# Patient Record
Sex: FEMALE | Race: WHITE
[De-identification: names, ages, dates, MRNs, and addresses within clinical notes are randomized per-mention and may not be internally consistent; named-entity substitution may affect disease eponyms.]

---

## 2017-01-11 ENCOUNTER — RX ONLY (OUTPATIENT)
Age: 77
Setting detail: RX ONLY
End: 2017-01-11

## 2017-01-11 PROBLEM — L57.9 SKIN CHANGES DUE TO CHRONIC EXPOSURE TO NONIONIZING RADIATION, UNSPECIFIED: Status: ACTIVE | Noted: 2017-01-11

## 2017-01-25 PROBLEM — D49.2 NEOPLASM OF UNSPECIFIED BEHAVIOR OF BONE, SOFT TISSUE, AND SKIN: Status: RESOLVED | Noted: 2017-01-11 | Resolved: 2017-01-25

## 2017-02-27 RX ORDER — ALENDRONATE SODIUM 70 MG/1
TABLET ORAL
Refills: 0 | OUTPATIENT
Start: 2017-02-27

## 2021-01-14 DIAGNOSIS — Z23 NEED FOR VACCINATION: ICD-10-CM

## 2023-10-20 ENCOUNTER — APPOINTMENT (OUTPATIENT)
Dept: RADIOLOGY | Facility: MEDICAL CENTER | Age: 83
End: 2023-10-20
Attending: STUDENT IN AN ORGANIZED HEALTH CARE EDUCATION/TRAINING PROGRAM
Payer: MEDICARE

## 2023-10-20 ENCOUNTER — HOSPITAL ENCOUNTER (OUTPATIENT)
Facility: MEDICAL CENTER | Age: 83
End: 2023-10-21
Attending: STUDENT IN AN ORGANIZED HEALTH CARE EDUCATION/TRAINING PROGRAM | Admitting: INTERNAL MEDICINE
Payer: MEDICARE

## 2023-10-20 DIAGNOSIS — I63.9 CEREBROVASCULAR ACCIDENT (CVA), UNSPECIFIED MECHANISM (HCC): ICD-10-CM

## 2023-10-20 DIAGNOSIS — R55 SYNCOPE AND COLLAPSE: ICD-10-CM

## 2023-10-20 LAB
ALBUMIN SERPL BCP-MCNC: 3.4 G/DL (ref 3.2–4.9)
ALBUMIN/GLOB SERPL: 1.1 G/DL
ALP SERPL-CCNC: 65 U/L (ref 30–99)
ALT SERPL-CCNC: 13 U/L (ref 2–50)
ANION GAP SERPL CALC-SCNC: 12 MMOL/L (ref 7–16)
AST SERPL-CCNC: 22 U/L (ref 12–45)
BASOPHILS # BLD AUTO: 0.3 % (ref 0–1.8)
BASOPHILS # BLD: 0.03 K/UL (ref 0–0.12)
BILIRUB SERPL-MCNC: 0.2 MG/DL (ref 0.1–1.5)
BUN SERPL-MCNC: 25 MG/DL (ref 8–22)
CALCIUM ALBUM COR SERPL-MCNC: 8.2 MG/DL (ref 8.5–10.5)
CALCIUM SERPL-MCNC: 7.7 MG/DL (ref 8.5–10.5)
CHLORIDE SERPL-SCNC: 108 MMOL/L (ref 96–112)
CO2 SERPL-SCNC: 21 MMOL/L (ref 20–33)
COMMENT 1642: NORMAL
CREAT SERPL-MCNC: 1.51 MG/DL (ref 0.5–1.4)
EOSINOPHIL # BLD AUTO: 0.07 K/UL (ref 0–0.51)
EOSINOPHIL NFR BLD: 0.6 % (ref 0–6.9)
ERYTHROCYTE [DISTWIDTH] IN BLOOD BY AUTOMATED COUNT: 65.8 FL (ref 35.9–50)
GFR SERPLBLD CREATININE-BSD FMLA CKD-EPI: 34 ML/MIN/1.73 M 2
GLOBULIN SER CALC-MCNC: 3.2 G/DL (ref 1.9–3.5)
GLUCOSE SERPL-MCNC: 144 MG/DL (ref 65–99)
HCT VFR BLD AUTO: 34.6 % (ref 37–47)
HGB BLD-MCNC: 10.7 G/DL (ref 12–16)
IMM GRANULOCYTES # BLD AUTO: 0.04 K/UL (ref 0–0.11)
IMM GRANULOCYTES NFR BLD AUTO: 0.4 % (ref 0–0.9)
LYMPHOCYTES # BLD AUTO: 2.32 K/UL (ref 1–4.8)
LYMPHOCYTES NFR BLD: 21.1 % (ref 22–41)
MCH RBC QN AUTO: 28.7 PG (ref 27–33)
MCHC RBC AUTO-ENTMCNC: 30.9 G/DL (ref 32.2–35.5)
MCV RBC AUTO: 92.8 FL (ref 81.4–97.8)
MONOCYTES # BLD AUTO: 1.17 K/UL (ref 0–0.85)
MONOCYTES NFR BLD AUTO: 10.6 % (ref 0–13.4)
MORPHOLOGY BLD-IMP: NORMAL
NEUTROPHILS # BLD AUTO: 7.37 K/UL (ref 1.82–7.42)
NEUTROPHILS NFR BLD: 67 % (ref 44–72)
NRBC # BLD AUTO: 0 K/UL
NRBC BLD-RTO: 0 /100 WBC (ref 0–0.2)
PLATELET # BLD AUTO: 292 K/UL (ref 164–446)
PLATELET BLD QL SMEAR: NORMAL
PMV BLD AUTO: 11.5 FL (ref 9–12.9)
POIKILOCYTOSIS BLD QL SMEAR: NORMAL
POTASSIUM SERPL-SCNC: 4.1 MMOL/L (ref 3.6–5.5)
PROT SERPL-MCNC: 6.6 G/DL (ref 6–8.2)
RBC # BLD AUTO: 3.73 M/UL (ref 4.2–5.4)
RBC BLD AUTO: PRESENT
SODIUM SERPL-SCNC: 141 MMOL/L (ref 135–145)
TROPONIN T SERPL-MCNC: 19 NG/L (ref 6–19)
WBC # BLD AUTO: 11 K/UL (ref 4.8–10.8)

## 2023-10-20 PROCEDURE — 84439 ASSAY OF FREE THYROXINE: CPT

## 2023-10-20 PROCEDURE — 71045 X-RAY EXAM CHEST 1 VIEW: CPT

## 2023-10-20 PROCEDURE — 36415 COLL VENOUS BLD VENIPUNCTURE: CPT

## 2023-10-20 PROCEDURE — 85025 COMPLETE CBC W/AUTO DIFF WBC: CPT

## 2023-10-20 PROCEDURE — 99285 EMERGENCY DEPT VISIT HI MDM: CPT

## 2023-10-20 PROCEDURE — 80053 COMPREHEN METABOLIC PANEL: CPT

## 2023-10-20 PROCEDURE — 84443 ASSAY THYROID STIM HORMONE: CPT

## 2023-10-20 PROCEDURE — 700105 HCHG RX REV CODE 258: Performed by: STUDENT IN AN ORGANIZED HEALTH CARE EDUCATION/TRAINING PROGRAM

## 2023-10-20 PROCEDURE — 84484 ASSAY OF TROPONIN QUANT: CPT

## 2023-10-20 PROCEDURE — 82533 TOTAL CORTISOL: CPT

## 2023-10-20 PROCEDURE — 82550 ASSAY OF CK (CPK): CPT

## 2023-10-20 PROCEDURE — 70450 CT HEAD/BRAIN W/O DYE: CPT

## 2023-10-20 RX ORDER — SODIUM CHLORIDE 9 MG/ML
1000 INJECTION, SOLUTION INTRAVENOUS ONCE
Status: COMPLETED | OUTPATIENT
Start: 2023-10-20 | End: 2023-10-21

## 2023-10-20 RX ADMIN — SODIUM CHLORIDE 1000 ML: 9 INJECTION, SOLUTION INTRAVENOUS at 22:32

## 2023-10-21 ENCOUNTER — HOSPITAL ENCOUNTER (OUTPATIENT)
Dept: RADIOLOGY | Facility: MEDICAL CENTER | Age: 83
End: 2023-10-21

## 2023-10-21 VITALS
DIASTOLIC BLOOD PRESSURE: 66 MMHG | RESPIRATION RATE: 18 BRPM | TEMPERATURE: 98.6 F | SYSTOLIC BLOOD PRESSURE: 157 MMHG | HEART RATE: 68 BPM | OXYGEN SATURATION: 95 % | BODY MASS INDEX: 28.28 KG/M2 | HEIGHT: 65 IN | WEIGHT: 169.75 LBS

## 2023-10-21 PROBLEM — I63.9 CVA (CEREBRAL VASCULAR ACCIDENT) (HCC): Status: ACTIVE | Noted: 2023-10-21

## 2023-10-21 PROBLEM — R55 SYNCOPE AND COLLAPSE: Status: RESOLVED | Noted: 2023-10-21 | Resolved: 2023-10-21

## 2023-10-21 PROBLEM — R55 SYNCOPE AND COLLAPSE: Status: ACTIVE | Noted: 2023-10-21

## 2023-10-21 LAB
APPEARANCE UR: CLEAR
BACTERIA #/AREA URNS HPF: NEGATIVE /HPF
BILIRUB UR QL STRIP.AUTO: NEGATIVE
CK SERPL-CCNC: 330 U/L (ref 0–154)
COLOR UR: YELLOW
CORTIS SERPL-MCNC: 25.1 UG/DL (ref 0–23)
EKG IMPRESSION: NORMAL
EPI CELLS #/AREA URNS HPF: ABNORMAL /HPF
GLUCOSE BLD STRIP.AUTO-MCNC: 113 MG/DL (ref 65–99)
GLUCOSE BLD STRIP.AUTO-MCNC: 114 MG/DL (ref 65–99)
GLUCOSE UR STRIP.AUTO-MCNC: NEGATIVE MG/DL
HYALINE CASTS #/AREA URNS LPF: ABNORMAL /LPF
KETONES UR STRIP.AUTO-MCNC: ABNORMAL MG/DL
LEUKOCYTE ESTERASE UR QL STRIP.AUTO: NEGATIVE
MICRO URNS: ABNORMAL
NITRITE UR QL STRIP.AUTO: NEGATIVE
PH UR STRIP.AUTO: 5 [PH] (ref 5–8)
PROT UR QL STRIP: 30 MG/DL
RBC # URNS HPF: ABNORMAL /HPF
RBC UR QL AUTO: NEGATIVE
SP GR UR STRIP.AUTO: 1.02
T4 FREE SERPL-MCNC: 1.11 NG/DL (ref 0.93–1.7)
TSH SERPL DL<=0.005 MIU/L-ACNC: 5.75 UIU/ML (ref 0.38–5.33)
UROBILINOGEN UR STRIP.AUTO-MCNC: 1 MG/DL
WBC #/AREA URNS HPF: ABNORMAL /HPF

## 2023-10-21 PROCEDURE — 97161 PT EVAL LOW COMPLEX 20 MIN: CPT

## 2023-10-21 PROCEDURE — 92610 EVALUATE SWALLOWING FUNCTION: CPT

## 2023-10-21 PROCEDURE — 96372 THER/PROPH/DIAG INJ SC/IM: CPT

## 2023-10-21 PROCEDURE — 93010 ELECTROCARDIOGRAM REPORT: CPT | Mod: MISDOCU | Performed by: INTERNAL MEDICINE

## 2023-10-21 PROCEDURE — 93005 ELECTROCARDIOGRAM TRACING: CPT | Performed by: INTERNAL MEDICINE

## 2023-10-21 PROCEDURE — 81001 URINALYSIS AUTO W/SCOPE: CPT

## 2023-10-21 PROCEDURE — A9270 NON-COVERED ITEM OR SERVICE: HCPCS | Performed by: INTERNAL MEDICINE

## 2023-10-21 PROCEDURE — G0378 HOSPITAL OBSERVATION PER HR: HCPCS

## 2023-10-21 PROCEDURE — 700105 HCHG RX REV CODE 258: Performed by: INTERNAL MEDICINE

## 2023-10-21 PROCEDURE — 82962 GLUCOSE BLOOD TEST: CPT | Mod: 91

## 2023-10-21 PROCEDURE — 700102 HCHG RX REV CODE 250 W/ 637 OVERRIDE(OP): Performed by: INTERNAL MEDICINE

## 2023-10-21 PROCEDURE — 700111 HCHG RX REV CODE 636 W/ 250 OVERRIDE (IP): Performed by: INTERNAL MEDICINE

## 2023-10-21 PROCEDURE — 700105 HCHG RX REV CODE 258

## 2023-10-21 RX ORDER — HYDROCODONE BITARTRATE AND ACETAMINOPHEN 7.5; 325 MG/1; MG/1
1 TABLET ORAL 3 TIMES DAILY PRN
COMMUNITY
Start: 2023-10-02

## 2023-10-21 RX ORDER — SIMVASTATIN 20 MG
40 TABLET ORAL EVERY EVENING
Status: DISCONTINUED | OUTPATIENT
Start: 2023-10-21 | End: 2023-10-21 | Stop reason: HOSPADM

## 2023-10-21 RX ORDER — HYDRALAZINE HYDROCHLORIDE 20 MG/ML
10 INJECTION INTRAMUSCULAR; INTRAVENOUS EVERY 4 HOURS PRN
Status: DISCONTINUED | OUTPATIENT
Start: 2023-10-21 | End: 2023-10-21 | Stop reason: HOSPADM

## 2023-10-21 RX ORDER — METHYLPREDNISOLONE 4 MG/1
4 TABLET ORAL DAILY
COMMUNITY

## 2023-10-21 RX ORDER — DULOXETIN HYDROCHLORIDE 60 MG/1
60 CAPSULE, DELAYED RELEASE ORAL DAILY
COMMUNITY
Start: 2023-07-31

## 2023-10-21 RX ORDER — SODIUM CHLORIDE 9 MG/ML
INJECTION, SOLUTION INTRAVENOUS CONTINUOUS
Status: DISCONTINUED | OUTPATIENT
Start: 2023-10-21 | End: 2023-10-21 | Stop reason: HOSPADM

## 2023-10-21 RX ORDER — ONDANSETRON 4 MG/1
4 TABLET, ORALLY DISINTEGRATING ORAL EVERY 4 HOURS PRN
Status: DISCONTINUED | OUTPATIENT
Start: 2023-10-21 | End: 2023-10-21

## 2023-10-21 RX ORDER — DENOSUMAB 60 MG/ML
60 INJECTION SUBCUTANEOUS
COMMUNITY
Start: 2023-07-27

## 2023-10-21 RX ORDER — GABAPENTIN 400 MG/1
400 CAPSULE ORAL 3 TIMES DAILY
COMMUNITY
Start: 2023-10-09

## 2023-10-21 RX ORDER — ACETAMINOPHEN 325 MG/1
650 TABLET ORAL EVERY 6 HOURS PRN
Status: DISCONTINUED | OUTPATIENT
Start: 2023-10-21 | End: 2023-10-21 | Stop reason: HOSPADM

## 2023-10-21 RX ORDER — ONDANSETRON 2 MG/ML
4 INJECTION INTRAMUSCULAR; INTRAVENOUS EVERY 4 HOURS PRN
Status: DISCONTINUED | OUTPATIENT
Start: 2023-10-21 | End: 2023-10-21

## 2023-10-21 RX ORDER — ASPIRIN 325 MG
325 TABLET, DELAYED RELEASE (ENTERIC COATED) ORAL DAILY
Qty: 30 TABLET | COMMUNITY
Start: 2023-10-21

## 2023-10-21 RX ORDER — FLUOXETINE HYDROCHLORIDE 20 MG/1
20 CAPSULE ORAL DAILY
Status: DISCONTINUED | OUTPATIENT
Start: 2023-10-21 | End: 2023-10-21

## 2023-10-21 RX ORDER — NETARSUDIL 0.2 MG/ML
1 SOLUTION/ DROPS OPHTHALMIC; TOPICAL NIGHTLY
COMMUNITY
Start: 2023-09-18

## 2023-10-21 RX ORDER — BISACODYL 10 MG
10 SUPPOSITORY, RECTAL RECTAL
Status: DISCONTINUED | OUTPATIENT
Start: 2023-10-21 | End: 2023-10-21 | Stop reason: HOSPADM

## 2023-10-21 RX ORDER — LATANOPROST 50 UG/ML
1 SOLUTION/ DROPS OPHTHALMIC NIGHTLY
Status: DISCONTINUED | OUTPATIENT
Start: 2023-10-21 | End: 2023-10-21 | Stop reason: HOSPADM

## 2023-10-21 RX ORDER — AMOXICILLIN 250 MG
2 CAPSULE ORAL 2 TIMES DAILY
Status: DISCONTINUED | OUTPATIENT
Start: 2023-10-21 | End: 2023-10-21 | Stop reason: HOSPADM

## 2023-10-21 RX ORDER — ALBUTEROL SULFATE 90 UG/1
2 AEROSOL, METERED RESPIRATORY (INHALATION) EVERY 6 HOURS PRN
Status: DISCONTINUED | OUTPATIENT
Start: 2023-10-21 | End: 2023-10-21

## 2023-10-21 RX ORDER — LEVOTHYROXINE SODIUM 0.03 MG/1
25 TABLET ORAL
COMMUNITY
Start: 2023-08-03

## 2023-10-21 RX ORDER — OXYCODONE HYDROCHLORIDE 5 MG/1
5 TABLET ORAL
Status: DISCONTINUED | OUTPATIENT
Start: 2023-10-21 | End: 2023-10-21 | Stop reason: HOSPADM

## 2023-10-21 RX ORDER — HYDROMORPHONE HYDROCHLORIDE 1 MG/ML
0.25 INJECTION, SOLUTION INTRAMUSCULAR; INTRAVENOUS; SUBCUTANEOUS
Status: DISCONTINUED | OUTPATIENT
Start: 2023-10-21 | End: 2023-10-21 | Stop reason: HOSPADM

## 2023-10-21 RX ORDER — ASPIRIN 81 MG/1
81 TABLET ORAL DAILY
Status: DISCONTINUED | OUTPATIENT
Start: 2023-10-21 | End: 2023-10-21 | Stop reason: HOSPADM

## 2023-10-21 RX ORDER — POLYETHYLENE GLYCOL 3350 17 G/17G
1 POWDER, FOR SOLUTION ORAL
Status: DISCONTINUED | OUTPATIENT
Start: 2023-10-21 | End: 2023-10-21 | Stop reason: HOSPADM

## 2023-10-21 RX ORDER — PREDNISONE 10 MG/1
5 TABLET ORAL DAILY
Status: DISCONTINUED | OUTPATIENT
Start: 2023-10-21 | End: 2023-10-21

## 2023-10-21 RX ORDER — M-VIT,TX,IRON,MINS/CALC/FOLIC 27MG-0.4MG
1 TABLET ORAL DAILY
COMMUNITY

## 2023-10-21 RX ORDER — CHLORAL HYDRATE 500 MG
1000 CAPSULE ORAL DAILY
COMMUNITY

## 2023-10-21 RX ORDER — DULOXETIN HYDROCHLORIDE 60 MG/1
60 CAPSULE, DELAYED RELEASE ORAL DAILY
Status: DISCONTINUED | OUTPATIENT
Start: 2023-10-21 | End: 2023-10-21 | Stop reason: HOSPADM

## 2023-10-21 RX ORDER — DORZOLAMIDE HCL 20 MG/ML
1 SOLUTION/ DROPS OPHTHALMIC 2 TIMES DAILY
Status: DISCONTINUED | OUTPATIENT
Start: 2023-10-21 | End: 2023-10-21

## 2023-10-21 RX ORDER — LATANOPROST 50 UG/ML
1 SOLUTION/ DROPS OPHTHALMIC NIGHTLY
COMMUNITY
Start: 2023-10-01

## 2023-10-21 RX ORDER — DORZOLAMIDE HYDROCHLORIDE AND TIMOLOL MALEATE 20; 5 MG/ML; MG/ML
1 SOLUTION/ DROPS OPHTHALMIC 2 TIMES DAILY
COMMUNITY
Start: 2023-09-05

## 2023-10-21 RX ORDER — HEPARIN SODIUM 5000 [USP'U]/ML
5000 INJECTION, SOLUTION INTRAVENOUS; SUBCUTANEOUS EVERY 8 HOURS
Status: DISCONTINUED | OUTPATIENT
Start: 2023-10-21 | End: 2023-10-21 | Stop reason: HOSPADM

## 2023-10-21 RX ORDER — DORZOLAMIDE HYDROCHLORIDE AND TIMOLOL MALEATE 20; 5 MG/ML; MG/ML
1 SOLUTION/ DROPS OPHTHALMIC 2 TIMES DAILY
Status: DISCONTINUED | OUTPATIENT
Start: 2023-10-21 | End: 2023-10-21 | Stop reason: HOSPADM

## 2023-10-21 RX ORDER — OXYCODONE HYDROCHLORIDE 5 MG/1
2.5 TABLET ORAL
Status: DISCONTINUED | OUTPATIENT
Start: 2023-10-21 | End: 2023-10-21 | Stop reason: HOSPADM

## 2023-10-21 RX ORDER — LEVOTHYROXINE SODIUM 0.03 MG/1
25 TABLET ORAL
Status: DISCONTINUED | OUTPATIENT
Start: 2023-10-21 | End: 2023-10-21 | Stop reason: HOSPADM

## 2023-10-21 RX ORDER — VITAMIN B COMPLEX
1000 TABLET ORAL DAILY
COMMUNITY

## 2023-10-21 RX ADMIN — SODIUM CHLORIDE: 9 INJECTION, SOLUTION INTRAVENOUS at 11:03

## 2023-10-21 RX ADMIN — OXYCODONE HYDROCHLORIDE 5 MG: 5 TABLET ORAL at 10:11

## 2023-10-21 RX ADMIN — DORZOLAMIDE HYDROCHLORIDE AND TIMOLOL MALEATE 1 DROP: 20; 5 SOLUTION/ DROPS OPHTHALMIC at 05:55

## 2023-10-21 RX ADMIN — LEVOTHYROXINE SODIUM 25 MCG: 0.03 TABLET ORAL at 05:55

## 2023-10-21 RX ADMIN — ASPIRIN 81 MG: 81 TABLET, COATED ORAL at 05:55

## 2023-10-21 RX ADMIN — LATANOPROST 1 DROP: 50 SOLUTION OPHTHALMIC at 05:55

## 2023-10-21 RX ADMIN — OXYCODONE HYDROCHLORIDE 2.5 MG: 5 TABLET ORAL at 15:28

## 2023-10-21 RX ADMIN — HEPARIN SODIUM 5000 UNITS: 5000 INJECTION, SOLUTION INTRAVENOUS; SUBCUTANEOUS at 05:55

## 2023-10-21 RX ADMIN — SODIUM CHLORIDE: 9 INJECTION, SOLUTION INTRAVENOUS at 02:23

## 2023-10-21 RX ADMIN — DULOXETINE HYDROCHLORIDE 60 MG: 60 CAPSULE, DELAYED RELEASE ORAL at 05:55

## 2023-10-21 ASSESSMENT — GAIT ASSESSMENTS
ASSISTIVE DEVICE: FRONT WHEEL WALKER
GAIT LEVEL OF ASSIST: STANDBY ASSIST
DISTANCE (FEET): 250

## 2023-10-21 ASSESSMENT — ENCOUNTER SYMPTOMS
FOCAL WEAKNESS: 0
DOUBLE VISION: 0
ORTHOPNEA: 0
BLURRED VISION: 0
LOSS OF CONSCIOUSNESS: 1
HEMOPTYSIS: 0
PHOTOPHOBIA: 0
PALPITATIONS: 0
BRUISES/BLEEDS EASILY: 0
TREMORS: 0
HEARTBURN: 0
SPUTUM PRODUCTION: 0
WEIGHT LOSS: 0
FLANK PAIN: 0
NECK PAIN: 0
HEADACHES: 0
BACK PAIN: 0
POLYDIPSIA: 0
COUGH: 0
SPEECH CHANGE: 0
NAUSEA: 0
CHILLS: 0
FEVER: 0
NERVOUS/ANXIOUS: 0
VOMITING: 0
HALLUCINATIONS: 0

## 2023-10-21 ASSESSMENT — LIFESTYLE VARIABLES
TOTAL SCORE: 0
EVER HAD A DRINK FIRST THING IN THE MORNING TO STEADY YOUR NERVES TO GET RID OF A HANGOVER: NO
SUBSTANCE_ABUSE: 0
TOTAL SCORE: 0
CONSUMPTION TOTAL: NEGATIVE
ALCOHOL_USE: NO
HOW MANY TIMES IN THE PAST YEAR HAVE YOU HAD 5 OR MORE DRINKS IN A DAY: 0
TOTAL SCORE: 0
ON A TYPICAL DAY WHEN YOU DRINK ALCOHOL HOW MANY DRINKS DO YOU HAVE: 0
HAVE PEOPLE ANNOYED YOU BY CRITICIZING YOUR DRINKING: NO
HAVE YOU EVER FELT YOU SHOULD CUT DOWN ON YOUR DRINKING: NO
AVERAGE NUMBER OF DAYS PER WEEK YOU HAVE A DRINK CONTAINING ALCOHOL: 0
EVER FELT BAD OR GUILTY ABOUT YOUR DRINKING: NO
DOES PATIENT WANT TO STOP DRINKING: NO

## 2023-10-21 ASSESSMENT — COGNITIVE AND FUNCTIONAL STATUS - GENERAL
TURNING FROM BACK TO SIDE WHILE IN FLAT BAD: A LITTLE
MOBILITY SCORE: 18
WALKING IN HOSPITAL ROOM: A LITTLE
SUGGESTED CMS G CODE MODIFIER MOBILITY: CK
CLIMB 3 TO 5 STEPS WITH RAILING: A LITTLE
MOVING FROM LYING ON BACK TO SITTING ON SIDE OF FLAT BED: A LITTLE
STANDING UP FROM CHAIR USING ARMS: A LITTLE
MOVING TO AND FROM BED TO CHAIR: A LITTLE

## 2023-10-21 ASSESSMENT — PATIENT HEALTH QUESTIONNAIRE - PHQ9
2. FEELING DOWN, DEPRESSED, IRRITABLE, OR HOPELESS: NOT AT ALL
1. LITTLE INTEREST OR PLEASURE IN DOING THINGS: NOT AT ALL
SUM OF ALL RESPONSES TO PHQ9 QUESTIONS 1 AND 2: 0

## 2023-10-21 ASSESSMENT — PAIN DESCRIPTION - PAIN TYPE
TYPE: CHRONIC PAIN

## 2023-10-21 ASSESSMENT — FIBROSIS 4 INDEX: FIB4 SCORE: 1.73

## 2023-10-21 NOTE — PROGRESS NOTES
2 RN Skin Assessment Completed by Jessie RN and Debi RN.     Head: WDL  Ears: WDL  Nose: WDL  Mouth: WDL  Neck: WDL  Breasts/Chest: Left implanted cardiac monitor recently placed, with gauze and tegaderm dressing over it.   Shoulder Blades: WDL  Spine: WDL  (R) Arm/Elbow/hand: WDL  (L) Arm/Elbow/hand: WDL  Abdomen:WDL  Groin: WDL  Sacrum/Coccyx/Buttocks: blanching  (R) Leg: Generalized lower leg edema  (L) Leg: Generalized lower leg edema  (R) Heel/Foot/Toe: blanching  (L) Heel/Foot/Toe: blanching              Devices in place: BP Cuff and Pulse Ox     Interventions in place: Pillows     Possible skin injury found: No     Pictures uploaded into Epic: N/A  Wound Consult Placed: N/A

## 2023-10-21 NOTE — DISCHARGE PLANNING
Case Management Discharge Planning    Admission Date: 10/20/2023  GMLOS:    ALOS: 0    6-Clicks ADL Score:    6-Clicks Mobility Score: 18      Anticipated Discharge Dispo:      DME Needed: No    Action(s) Taken: Choice obtained and Referral(s) sent Signed choice form sent to [facility] for [facility].    Escalations Completed: None    Medically Clear: Yes    Next Steps: Per P/T: Patient needing HH for SN, P/T and O/T for speech, recent stroke.    Barriers to Discharge: None    Is the patient up for discharge tomorrow: No Today, patient's SO is bedside and can provide ride home.
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
## 2023-10-21 NOTE — ED TRIAGE NOTES
[facility]  83 y.o. female  Chief Complaint   Patient presents with    T-5000 FALL    ALOC     Pt BIB EMS for above complaint.  reports patient walking with wheeled walker at home, legs gace out and fell to ground. (+) headstrike. (-) LOC or thinner. BGL - 201mg/dL. Aox3. Per EMS, initial GCS 3, BP in the 30s, with shallow breathing. BVM done. No other treatment given. Patient spontaneously  became GCS -14 during transport.      Discharged from [facility] a few hours prior for stroke. Left chest cardiac monitor implant.     speaking in full sentences, follows commands and responds appropriately to questions. Resp are even and unlabored. Patient denies pain.       Vitals:    10/20/23 2214   BP: (!) 128/101   Pulse: 69   Resp: 17   Temp: 36 °C (96.8 °F)   SpO2: 93%

## 2023-10-21 NOTE — DISCHARGE SUMMARY
Discharge Summary    CHIEF COMPLAINT ON ADMISSION  Chief Complaint   Patient presents with    T-5000 FALL    ALOC       Reason for Admission  Syncope    Admission Date  10/20/2023    CODE STATUS  Full Code    HPI & HOSPITAL COURSE  Nury Hurtado is an 83-year-old female with past medical history of hypothyroidism, chronic pain, osteoporosis, recent CVA.  She presented to the ED 10/20/2023 after syncopal episode.     Per , patient stood up to walk and passed out, falling and hitting her head.  Per EMS initial GCS was 3 and a systolic blood pressure in the 40s, both resolved after IV hydration.  Besides aphasia, there was no gross neurological deficit on exam.     Previous to this, she recently had an admission at [facility] 10/18 - 10/20 with NIH score of 2 secondary to dysarthria and aphasia was found on CT to have left frontal area acute infarction. She was ultimately discharged home with statin, aspirin, home health, and implanted loop recorder.     On arrival to ED here her NIH was 1 for dysarthria, at her baseline. On CT she had subacute left frontal lobe infarct and older-appearing right temporoparietal lobe infarct. MRI was obtained from [facility] and this was present on that imaging as well, no new findings. She had initial hypotensive presentation that resolved with fluid bolus, her suspected etiology of syncope was volume depletion. She did have positive orthostatic hypotension. She was started on IV fluids and observed overnight on telemetry.     In the morning her orthostatic hypotension resolved after rehydration. No further recurrence of symptoms. She tolerated ambulation with physical therapy well, PT recommending home health services. NIH at baseline. Thus she was cleared for discharge back home with home health on her previous post-stroke medical regimen prescribed by her neurologists at [facility]. Recommend ample hydration, rest, outpatient follow up on Monday with those providers,  return to ED for any worsening stroke-like symptoms.    Therefore, she is discharged in good and stable condition to home with organized home healthcare and close outpatient follow-up.    Discharge Date  10/21/2023    FOLLOW UP ITEMS POST DISCHARGE  Follow with PCP on Monday    DISCHARGE DIAGNOSES  Principal Problem:    Syncope and collapse (POA: Yes)  Active Problems:    Back pain (POA: Yes)    Anemia (POA: Yes)    CVA (cerebral vascular accident) (HCC) (POA: Unknown)  Resolved Problems:    * No resolved hospital problems. *      FOLLOW UP  Follow with PCP on Monday    MEDICATIONS ON DISCHARGE     Medication List        ASK your doctor about these medications        Instructions   dorzolamide-timolol 2-0.5 % Soln  Commonly known as: Cosopt   Administer 1 Drop into both eyes 2 times a day.  Dose: 1 Drop     DULoxetine 60 MG Cpep delayed-release capsule  Commonly known as: Cymbalta   Take 60 mg by mouth every day.  Dose: 60 mg     fish oil 1000 MG Caps capsule   Take 1,000 mg by mouth every day.  Dose: 1,000 mg     gabapentin 400 MG Caps  Commonly known as: Neurontin   Take 400 mg by mouth 3 times a day.  Dose: 400 mg     HYDROcodone-acetaminophen 7.5-325 MG tab  Commonly known as: Norco   Take 1 Tablet by mouth 3 times a day as needed.  Dose: 1 Tablet     latanoprost 0.005 % Soln  Commonly known as: Xalatan   Administer 1 Drop into both eyes every evening.  Dose: 1 Drop     levothyroxine 25 MCG Tabs  Commonly known as: Synthroid   Take 25 mcg by mouth every morning on an empty stomach.  Dose: 25 mcg     MELATONIN PO   Take 1 Capsule by mouth at bedtime as needed.  Dose: 1 Capsule     methylPREDNISolone 4 MG Tbpk  Commonly known as: Medrol Dosepak   Take 4 mg by mouth every day. Follow schedule on package instructions.  Dose: 4 mg     Prolia 60 MG/ML Sosy injection  Generic drug: denosumab   Inject 60 mg under the skin every 6 months.  Dose: 60 mg     Rhopressa 0.02 % Soln  Generic drug: Netarsudil Dimesylate    Administer 1 Drop into both eyes every evening.  Dose: 1 Drop     simvastatin 40 MG Tabs  Commonly known as: Zocor   TAKE ONE TABLET BY MOUTH IN THE EVENING     therapeutic multivitamin-minerals Tabs   Take 1 Tablet by mouth every day.  Dose: 1 Tablet     vitamin D3 1000 Unit (25 mcg) Tabs  Commonly known as: Cholecalciferol   Take 1,000 Units by mouth every day.  Dose: 1,000 Units              Allergies  No Known Allergies    DIET  Orders Placed This Encounter   Procedures    Diet Order Diet: Regular     Standing Status:   Standing     Number of Occurrences:   1     Order Specific Question:   Diet:     Answer:   Regular [1]       ACTIVITY  As tolerated.  Weight bearing as tolerated    CONSULTATIONS  none    PROCEDURES  none    LABORATORY  Lab Results   Component Value Date    SODIUM 141 10/20/2023    POTASSIUM 4.1 10/20/2023    CHLORIDE 108 10/20/2023    CO2 21 10/20/2023    GLUCOSE 144 (H) 10/20/2023    BUN 25 (H) 10/20/2023    CREATININE 1.51 (H) 10/20/2023    CREATININE 0.73 10/20/2011    GLOMRATE >59 11/11/2010        Lab Results   Component Value Date    WBC 11.0 (H) 10/20/2023    WBC 6.1 10/20/2011    HEMOGLOBIN 10.7 (L) 10/20/2023    HEMATOCRIT 34.6 (L) 10/20/2023    PLATELETCT 292 10/20/2023        Time spent with patient, 20 minutes

## 2023-10-21 NOTE — PROGRESS NOTES
Pt was brought down to discharge lounge. This RN removed both IV's. Pt states already having the 325 mg of Aspirin at home. Pt states having all belongings. Pt does not have any additional questions regarding. Pt's partner will be picking her up.

## 2023-10-21 NOTE — ASSESSMENT & PLAN NOTE
Patient was admitted to [facility] 10/18 to 10/21 with acute stroke,  CT head without contrast showed left frontal area infarction.   CTA head and neck did not show large vessels occlusion.  Repeat CT head without contrast here showed left frontal area and right temporoparietal area infarction, acute versus subacute.  Patient has aphasia and dysarthria, no new gross motor deficit developed additionally to that.  MRI of the brain was reportedly done and we are awaiting for the rest of the records to be faxed  Review MRI of the brain, transthoracic echo when available and decide if repeat MRI of the brain or formal neurology consult needed  Continue aspirin, statin  PT OT and speech evaluation

## 2023-10-21 NOTE — PROGRESS NOTES
Report received from CACHORRO Roman.  Assumed care of patient.  Assessment complete.  Plan of care gone over with the patient and all concerns addressed.  Patient sitting up in bed. Patient A & O x 2, disoriented to time and event.  No apparent signs of distress.  Safety precautions in place.  Patient educated to call for assistance.  Hourly rounding in place.

## 2023-10-21 NOTE — THERAPY
Speech Language Pathology   Clinical Swallow Evaluation     Patient Name: Nury Hurtado  AGE:  83 y.o., SEX:  female  Medical Record #: [id number]  Date of Service: 10/21/2023      History of Present Illness  83 y.o. naomy who presented on 10/20 after a syncopal episode.     PMHx: Hypothyroidism, chronic pain, osteoporosis, recent CVA    No previous SLP services at [facility].    CT Head:   1.  There is demonstrating loss of gray-white differentiation in the left frontal cortex and right temporoparietal regions are suspicious for acute to subacute infarcts.  2.  Mild diffuse cerebral substance loss.  3.  Moderate microangiopathic ischemic change versus demyelination or gliosis.    CXR: No acute cardiopulmonary disease evident.      General Information:  Vitals  O2 Delivery Device: None - Room Air  Level of Consciousness: Alert, Awake  Orientation: Self, General place (Unable to provided situation secondary to aphasia deficits)  Follows Directives: Yes      Prior Living Situation & Level of Function:  Housing / Facility: [address]  Lives with - Patient's Self Care Capacity: Significant Other  Communication: Baseline dysarthria per EMR  Swallowing: WFL per pt report       Oral Mechanism Evaluation:  Dentition: Good, Natural dentition   Facial Symmetry: Equal  Facial Sensation: Equal     Labial Observations: WFL   Lingual Observations: Midline  Motor Speech: Dysarthric         Laryngeal Function:  Secretion Management: Adequate  Voice Quality: WFL  Cough: Perceptually WNL       Subjective  RN cleared patient for clinical swallow evaluation. Pt received awake, alert, upright in bed. [place] to self and place, however difficulty relaying reason for admission d/t word-finding deficits. Denied any history of swallowing difficulty. Agreeable to PO trials.      Assessment  Current Method of Nutrition: Oral diet (Regular solids/thin liquids)  Positioning: Brewer's (60-90 degrees)  Bolus Administration: Patient  O2 Delivery Device:  "None - Room Air  Factor(s) Affecting Performance: None  Tracheostomy : No      Swallowing Trials:  Swallowing Trials  Thin Liquid (TN0): WFL  Soft & Bite Sized (SB6): WFL  Regular (RG7): WFL      Comments: Pt able to self-feed without difficulty. Demo'd adequate oral bolus acceptance, labial stripping, and oral bolus containment. Presumed complete AP transfer with no oral bolus reside. Functional mastication of solids. No cough appreciated across trials. Throat clear observed prior, during, and post oral intake, difficult to discern if related to oral intake this date. Pt stated \"no reason,\" when asked about throat clear. Vocal quality remained stable throughout PO intake. One-two swallows completed per bolus. Denied any globus sensation with trials. Provided education regarding general aspiration precautions as well as signs of aspiration, pt stated understanding.         Clinical Impressions  Patient presents with a functional oropharyngeal swallow mechanism. No overt s/sx of aspiration appreciated. Recommend continuation of oral diet of regular solids and thin liquids. No further acute speech therapy needs targeting dysphagia indicated at this time, please re-consult with any difficulties or concerns. Pt may benefit from a speech-language evaluation if aphasia symptoms persist.       Recommendations  Diet Consistency: Regular solids/thin liquids  Instrumentation: None indicated at this time  Medication: As tolerated  Supervision: Independent  Positioning: Fully upright and midline during oral intake  Oral Care: BID         SLP Treatment Plan  Treatment Plan: None Indicated  SLP Frequency: N/A - Evaluation Only  Estimated Duration: N/A - Evaluation Only      Anticipated Discharge Needs  Discharge Recommendations: Anticipate that the patient will have no further speech therapy needs after discharge from the hospital (For dysphagia, pt may benefit from speech-language evaluation/treatment)   Therapy Recommendations " Upon DC: Expression Training             Corey Mccarty, SLP
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
## 2023-10-21 NOTE — PROGRESS NOTES
Orthostatics completed this morning and are as follows:   0520 Lying flat: 162/74  0522 Sittin/68  0524 Standing one minute:124/74  0528 Standing 3 minutes: 107/64

## 2023-10-21 NOTE — DISCHARGE PLANNING
Received choice form at: [id number]  Agency/Facility name: [facility]  Referral sent per choice form at:  [id number]

## 2023-10-21 NOTE — ASSESSMENT & PLAN NOTE
Etiology is unclear, but suspected volume depletion given hypotension on initial presentation that resolved after IV rehydration  No evidence of bleeding  CT head as noted showed acute/subacute infarction in the left frontal lobe and right temporoparietal lobe, without new neurological deficit on exam  Plan: Monitor on telemetry  Obtain rest of the records from [facility], including transthoracic echo if it was done, and MRI of the brain.  To be determined if transthoracic echo needs to be ordered  Repeat orthostatic vitals, cortisol and TSH

## 2023-10-21 NOTE — ED NOTES
Bedside report received from off going RN/tech: Ancelmo assumed care of patient.  POC discussed with patient. Call light within reach, all needs addressed at this time.       Fall risk interventions in place: Patient's personal possessions are with in their safe reach and Place socks on patient (all applicable per [place] Fall risk assessment)   Continuous monitoring: Cardiac Leads, Pulse Ox, or Blood Pressure  IVF/IV medications: Not Applicable   Oxygen: Room Air  Bedside sitter: Not Applicable   Isolation: Not Applicable

## 2023-10-21 NOTE — ED NOTES
Med rec complete per patient/significant other at bedside  Allergies reviewed.   Patient denies any outpatient antibiotics in the last 30 days.   Anticoagulants taken in the last 14 days? No     Patient prescribed Aspirin 325 mg daily and Lisinopril 10 mg daily at doctors office on 10/20/23, patient has not started these meds yet.     Preferred pharmacy for this visit - [facility] on [address]     Courtney Breen CPhT

## 2023-10-21 NOTE — CARE PLAN
Problem: Knowledge Deficit - Standard  Goal: Patient and family/care givers will demonstrate understanding of plan of care, disease process/condition, diagnostic tests and medications  Outcome: Progressing     Problem: Fall Risk  Goal: Patient will remain free from falls  Outcome: Progressing     Problem: Pain - Standard  Goal: Alleviation of pain or a reduction in pain to the patient’s comfort goal  Outcome: Progressing   The patient is Stable - Low risk of patient condition declining or worsening    Shift Goals  Clinical Goals: neuro checks, IV fluids, pain control, PT/OT  Patient Goals: rest  Family Goals: NA    Progress made toward(s) clinical / shift goals:  Patient is able to verbalize her plan of care.    Patient is not progressing towards the following goals: N/A

## 2023-10-21 NOTE — CARE PLAN
The patient is Stable - Low risk of patient condition declining or worsening    Shift Goals  Clinical Goals: Neuro checks, IV hydration, Rest  Patient Goals: Rest  Family Goals: NA    Progress made toward(s) clinical / shift goals:    Problem: Knowledge Deficit - Standard  Goal: Patient and family/care givers will demonstrate understanding of plan of care, disease process/condition, diagnostic tests and medications  Outcome: Progressing     Problem: Fall Risk  Goal: Patient will remain free from falls  Outcome: Progressing